# Patient Record
Sex: MALE | Race: OTHER | Employment: UNEMPLOYED | URBAN - NONMETROPOLITAN AREA
[De-identification: names, ages, dates, MRNs, and addresses within clinical notes are randomized per-mention and may not be internally consistent; named-entity substitution may affect disease eponyms.]

---

## 2018-08-06 ENCOUNTER — OFFICE VISIT (OUTPATIENT)
Dept: FAMILY MEDICINE CLINIC | Age: 63
End: 2018-08-06

## 2018-08-06 VITALS
HEART RATE: 73 BPM | RESPIRATION RATE: 18 BRPM | WEIGHT: 229 LBS | BODY MASS INDEX: 32.06 KG/M2 | SYSTOLIC BLOOD PRESSURE: 138 MMHG | HEIGHT: 71 IN | OXYGEN SATURATION: 96 % | DIASTOLIC BLOOD PRESSURE: 88 MMHG

## 2018-08-06 DIAGNOSIS — J42 CHRONIC BRONCHITIS, UNSPECIFIED CHRONIC BRONCHITIS TYPE (HCC): Primary | ICD-10-CM

## 2018-08-06 DIAGNOSIS — K92.1 BLOOD IN STOOL: ICD-10-CM

## 2018-08-06 DIAGNOSIS — R10.11 RIGHT UPPER QUADRANT ABDOMINAL PAIN: ICD-10-CM

## 2018-08-06 DIAGNOSIS — I10 ESSENTIAL HYPERTENSION: ICD-10-CM

## 2018-08-06 PROCEDURE — 99204 OFFICE O/P NEW MOD 45 MIN: CPT | Performed by: FAMILY MEDICINE

## 2018-08-06 RX ORDER — BENAZEPRIL HYDROCHLORIDE 20 MG/1
20 TABLET ORAL DAILY
Qty: 30 TABLET | Refills: 5 | Status: SHIPPED | OUTPATIENT
Start: 2018-08-06

## 2018-08-06 RX ORDER — AMLODIPINE BESYLATE 5 MG/1
5 TABLET ORAL DAILY
COMMUNITY
End: 2018-08-06 | Stop reason: SDUPTHER

## 2018-08-06 RX ORDER — ASPIRIN 81 MG/1
81 TABLET ORAL DAILY
Qty: 30 TABLET | Refills: 3 | Status: SHIPPED | OUTPATIENT
Start: 2018-08-06

## 2018-08-06 RX ORDER — PAROXETINE HYDROCHLORIDE 20 MG/1
20 TABLET, FILM COATED ORAL EVERY MORNING
Qty: 30 TABLET | Refills: 5 | Status: SHIPPED | OUTPATIENT
Start: 2018-08-06

## 2018-08-06 RX ORDER — AMLODIPINE BESYLATE 5 MG/1
5 TABLET ORAL DAILY
Qty: 30 TABLET | Refills: 5 | Status: SHIPPED | OUTPATIENT
Start: 2018-08-06

## 2018-08-06 RX ORDER — PAROXETINE HYDROCHLORIDE 20 MG/1
20 TABLET, FILM COATED ORAL EVERY MORNING
COMMUNITY
End: 2018-08-06 | Stop reason: SDUPTHER

## 2018-08-06 RX ORDER — ALBUTEROL SULFATE 90 UG/1
2 AEROSOL, METERED RESPIRATORY (INHALATION) EVERY 6 HOURS PRN
Qty: 1 INHALER | Refills: 3 | Status: SHIPPED | OUTPATIENT
Start: 2018-08-06

## 2018-08-06 RX ORDER — BENAZEPRIL HYDROCHLORIDE 20 MG/1
20 TABLET ORAL DAILY
COMMUNITY
End: 2018-08-06 | Stop reason: SDUPTHER

## 2018-08-06 ASSESSMENT — ENCOUNTER SYMPTOMS
SINUS PRESSURE: 0
VOMITING: 1
SORE THROAT: 0
BLOOD IN STOOL: 1
BACK PAIN: 0
EYE DISCHARGE: 0
WHEEZING: 1
RHINORRHEA: 0
CHEST TIGHTNESS: 1
NAUSEA: 0
APNEA: 0
SHORTNESS OF BREATH: 1
CONSTIPATION: 0
EYE REDNESS: 0
COUGH: 1
COLOR CHANGE: 0
ABDOMINAL PAIN: 1
DIARRHEA: 1

## 2018-08-06 ASSESSMENT — PATIENT HEALTH QUESTIONNAIRE - PHQ9
1. LITTLE INTEREST OR PLEASURE IN DOING THINGS: 0
SUM OF ALL RESPONSES TO PHQ QUESTIONS 1-9: 1
SUM OF ALL RESPONSES TO PHQ9 QUESTIONS 1 & 2: 1
2. FEELING DOWN, DEPRESSED OR HOPELESS: 1

## 2018-08-06 NOTE — PROGRESS NOTES
done.        No past medical history on file. No past surgical history on file. No family history on file. Social History   Substance Use Topics    Smoking status: Former Smoker     Packs/day: 2.00     Years: 37.00     Start date: 1/1/1973     Quit date: 1/1/2010    Smokeless tobacco: Never Used    Alcohol use No      Current Outpatient Prescriptions   Medication Sig Dispense Refill    aspirin EC 81 MG EC tablet Take 1 tablet by mouth daily 30 tablet 3    PARoxetine (PAXIL) 20 MG tablet Take 1 tablet by mouth every morning 30 tablet 5    benazepril (LOTENSIN) 20 MG tablet Take 1 tablet by mouth daily 30 tablet 5    amLODIPine (NORVASC) 5 MG tablet Take 1 tablet by mouth daily 30 tablet 5    albuterol sulfate HFA (VENTOLIN HFA) 108 (90 Base) MCG/ACT inhaler Inhale 2 puffs into the lungs every 6 hours as needed for Wheezing 1 Inhaler 3     No current facility-administered medications for this visit. No Known Allergies    Health Maintenance   Topic Date Due    Potassium monitoring  1955    Creatinine monitoring  1955    Hepatitis C screen  1955    HIV screen  09/25/1972    DTaP/Tdap/Td vaccine (1 - Tdap) 09/25/1976    Lipid screen  09/25/1997    Diabetes screen  09/25/1997    Shingles Vaccine (1 of 2 - 2 Dose Series) 09/25/2007    Colon cancer screen colonoscopy  09/25/2007    Low dose CT lung screening  09/25/2012    Flu vaccine (1) 09/01/2018       Subjective:      Review of Systems   Constitutional: Positive for activity change, appetite change and fatigue. Negative for chills and fever. HENT: Negative for congestion, hearing loss, postnasal drip, rhinorrhea, sinus pressure, sore throat and tinnitus. Eyes: Positive for visual disturbance. Negative for discharge and redness. Respiratory: Positive for cough, chest tightness, shortness of breath and wheezing. Negative for apnea. Cardiovascular: Negative for chest pain, palpitations and leg swelling. Gastrointestinal: Positive for abdominal pain, blood in stool, diarrhea and vomiting. Negative for constipation and nausea. Endocrine: Negative for polyuria. Genitourinary: Negative for difficulty urinating, dysuria, frequency, hematuria and urgency. Musculoskeletal: Negative for arthralgias, back pain, joint swelling, myalgias and neck stiffness. Skin: Negative for color change and rash. Neurological: Positive for dizziness and headaches. Negative for tremors, seizures, syncope, light-headedness and numbness. Psychiatric/Behavioral: Positive for sleep disturbance. Negative for decreased concentration. The patient is nervous/anxious. Objective:     Physical Exam   Constitutional: He is oriented to person, place, and time. He appears well-developed and well-nourished. No distress. HENT:   Head: Normocephalic and atraumatic. Right Ear: External ear normal.   Left Ear: External ear normal.   Nose: Nose normal.   Mouth/Throat: Oropharynx is clear and moist.   Eyes: Conjunctivae and EOM are normal. Pupils are equal, round, and reactive to light. Right eye exhibits no discharge. Left eye exhibits no discharge. No scleral icterus. Neck: Normal range of motion. Neck supple. No thyromegaly present. Cardiovascular: Normal rate, regular rhythm, normal heart sounds and intact distal pulses. No murmur heard. Pulmonary/Chest: Effort normal. No respiratory distress. He has decreased breath sounds. He has wheezes. He has no rales. Abdominal: Soft. Bowel sounds are normal. He exhibits no distension and no mass. There is no tenderness. There is no rebound and no guarding. Musculoskeletal: Normal range of motion. He exhibits no edema. Right shoulder: He exhibits no tenderness, no bony tenderness and no swelling. Left shoulder: He exhibits no tenderness, no bony tenderness and no swelling. Cervical back: He exhibits no tenderness, no bony tenderness, no deformity and no spasm. Thoracic back: He exhibits no tenderness, no bony tenderness and no spasm. Lumbar back: He exhibits no tenderness, no bony tenderness, no swelling, no deformity, no pain and no spasm. Lymphadenopathy:     He has no cervical adenopathy. Neurological: He is alert and oriented to person, place, and time. No cranial nerve deficit. He exhibits normal muscle tone. Coordination normal.   Skin: Skin is warm and dry. He is not diaphoretic. No erythema. No pallor. Psychiatric: He has a normal mood and affect. His behavior is normal. Judgment and thought content normal.   Nursing note and vitals reviewed. /88 (Site: Left Arm, Position: Sitting, Cuff Size: Medium Adult)   Pulse 73   Resp 18   Ht 5' 11.25\" (1.81 m)   Wt 229 lb (103.9 kg)   SpO2 96%   BMI 31.72 kg/m²     Assessment:       Diagnosis Orders   1. Chronic bronchitis, unspecified chronic bronchitis type (HCC)  albuterol sulfate HFA (VENTOLIN HFA) 108 (90 Base) MCG/ACT inhaler   2. Right upper quadrant abdominal pain  Lipid Panel    Comprehensive Metabolic Panel    CBC Auto Differential    TSH with Reflex    Lipase   3. Blood in stool  [facility] ([facility]    POCT Fecal Immunochemical Test (FIT)   4. Essential hypertension  aspirin EC 81 MG EC tablet    benazepril (LOTENSIN) 20 MG tablet    amLODIPine (NORVASC) 5 MG tablet       Plan:     Copd per history, will hold on PFTs at this time due to lack of insurance. Albuterol given  Pt to try low fat diet to help control RUQ pain  Will get fasting labs  Refer to GI for colonoscopy as he has never been screened and does have blood in stool per report  HTN- meds refilled  Follow up in 6 wks to review    Discussed use, benefit, and side effects of prescribed medications. Barriers to medication compliance addressed. All patient questions answered. Pt voiced understanding. Return in about 2 months (around 10/6/2018) for follow up.     Orders benefit, and side effects of prescribed medications. All patient questions answered. Pt voiced understanding. Reviewed health maintenance. Instructed to continue current medications, diet and exercise. Patient agreed with treatment plan. Follow up as directed.      Electronically signed by Arlene Bone MD on 8/6/2018 at 12:12 PM